# Patient Record
Sex: MALE | Race: BLACK OR AFRICAN AMERICAN | Employment: OTHER | URBAN - METROPOLITAN AREA
[De-identification: names, ages, dates, MRNs, and addresses within clinical notes are randomized per-mention and may not be internally consistent; named-entity substitution may affect disease eponyms.]

---

## 2019-03-17 ENCOUNTER — HOSPITAL ENCOUNTER (EMERGENCY)
Age: 24
Discharge: HOME | End: 2019-03-17
Attending: EMERGENCY MEDICINE
Payer: COMMERCIAL

## 2019-03-17 VITALS
WEIGHT: 135 LBS | OXYGEN SATURATION: 100 % | TEMPERATURE: 98 F | SYSTOLIC BLOOD PRESSURE: 128 MMHG | RESPIRATION RATE: 22 BRPM | HEART RATE: 78 BPM | HEIGHT: 68 IN | DIASTOLIC BLOOD PRESSURE: 82 MMHG | BODY MASS INDEX: 20.46 KG/M2

## 2019-03-17 DIAGNOSIS — F12.10 TETRAHYDROCANNABINOL (THC) USE DISORDER, MILD, ABUSE: ICD-10-CM

## 2019-03-17 DIAGNOSIS — R40.0 SOMNOLENCE: Primary | ICD-10-CM

## 2019-03-17 LAB
ALBUMIN SERPL-MCNC: 4.3 G/DL (ref 3.4–5)
ALBUMIN/GLOB SERPL: 1.3 {RATIO} (ref 0.8–1.7)
ALP SERPL-CCNC: 58 U/L (ref 45–117)
ALT SERPL-CCNC: 16 U/L (ref 16–61)
AMPHET UR QL SCN: NEGATIVE
ANION GAP SERPL CALC-SCNC: 7 MMOL/L (ref 3–18)
AST SERPL-CCNC: 17 U/L (ref 15–37)
ATRIAL RATE: 49 BPM
BARBITURATES UR QL SCN: NEGATIVE
BASOPHILS # BLD: 0 K/UL (ref 0–0.1)
BASOPHILS NFR BLD: 1 % (ref 0–2)
BENZODIAZ UR QL: NEGATIVE
BILIRUB SERPL-MCNC: 0.6 MG/DL (ref 0.2–1)
BUN SERPL-MCNC: 19 MG/DL (ref 7–18)
BUN/CREAT SERPL: 16 (ref 12–20)
CALCIUM SERPL-MCNC: 8.9 MG/DL (ref 8.5–10.1)
CALCULATED P AXIS, ECG09: 83 DEGREES
CALCULATED R AXIS, ECG10: 93 DEGREES
CALCULATED T AXIS, ECG11: 79 DEGREES
CANNABINOIDS UR QL SCN: POSITIVE
CHLORIDE SERPL-SCNC: 109 MMOL/L (ref 100–108)
CO2 SERPL-SCNC: 25 MMOL/L (ref 21–32)
COCAINE UR QL SCN: NEGATIVE
CREAT SERPL-MCNC: 1.16 MG/DL (ref 0.6–1.3)
DIAGNOSIS, 93000: NORMAL
DIFFERENTIAL METHOD BLD: ABNORMAL
EOSINOPHIL # BLD: 0.3 K/UL (ref 0–0.4)
EOSINOPHIL NFR BLD: 4 % (ref 0–5)
ERYTHROCYTE [DISTWIDTH] IN BLOOD BY AUTOMATED COUNT: 12.5 % (ref 11.6–14.5)
ETHANOL SERPL-MCNC: <3 MG/DL (ref 0–3)
GLOBULIN SER CALC-MCNC: 3.2 G/DL (ref 2–4)
GLUCOSE BLD STRIP.AUTO-MCNC: 105 MG/DL (ref 70–110)
GLUCOSE SERPL-MCNC: 110 MG/DL (ref 74–99)
HCT VFR BLD AUTO: 42.4 % (ref 36–48)
HDSCOM,HDSCOM: ABNORMAL
HGB BLD-MCNC: 13.9 G/DL (ref 13–16)
LYMPHOCYTES # BLD: 3.8 K/UL (ref 0.9–3.6)
LYMPHOCYTES NFR BLD: 59 % (ref 21–52)
MAGNESIUM SERPL-MCNC: 2.2 MG/DL (ref 1.6–2.6)
MCH RBC QN AUTO: 28.6 PG (ref 24–34)
MCHC RBC AUTO-ENTMCNC: 32.8 G/DL (ref 31–37)
MCV RBC AUTO: 87.2 FL (ref 74–97)
METHADONE UR QL: NEGATIVE
MONOCYTES # BLD: 0.5 K/UL (ref 0.05–1.2)
MONOCYTES NFR BLD: 8 % (ref 3–10)
NEUTS SEG # BLD: 1.9 K/UL (ref 1.8–8)
NEUTS SEG NFR BLD: 28 % (ref 40–73)
OPIATES UR QL: NEGATIVE
P-R INTERVAL, ECG05: 152 MS
PCP UR QL: NEGATIVE
PLATELET # BLD AUTO: 291 K/UL (ref 135–420)
PMV BLD AUTO: 9.3 FL (ref 9.2–11.8)
POTASSIUM SERPL-SCNC: 3.3 MMOL/L (ref 3.5–5.5)
PROT SERPL-MCNC: 7.5 G/DL (ref 6.4–8.2)
Q-T INTERVAL, ECG07: 434 MS
QRS DURATION, ECG06: 102 MS
QTC CALCULATION (BEZET), ECG08: 392 MS
RBC # BLD AUTO: 4.86 M/UL (ref 4.7–5.5)
SODIUM SERPL-SCNC: 141 MMOL/L (ref 136–145)
VENTRICULAR RATE, ECG03: 49 BPM
WBC # BLD AUTO: 6.5 K/UL (ref 4.6–13.2)

## 2019-03-17 PROCEDURE — 96360 HYDRATION IV INFUSION INIT: CPT

## 2019-03-17 PROCEDURE — 74011250636 HC RX REV CODE- 250/636: Performed by: EMERGENCY MEDICINE

## 2019-03-17 PROCEDURE — 80307 DRUG TEST PRSMV CHEM ANLYZR: CPT

## 2019-03-17 PROCEDURE — 83735 ASSAY OF MAGNESIUM: CPT

## 2019-03-17 PROCEDURE — 82962 GLUCOSE BLOOD TEST: CPT

## 2019-03-17 PROCEDURE — 80053 COMPREHEN METABOLIC PANEL: CPT

## 2019-03-17 PROCEDURE — 93005 ELECTROCARDIOGRAM TRACING: CPT

## 2019-03-17 PROCEDURE — 85025 COMPLETE CBC W/AUTO DIFF WBC: CPT

## 2019-03-17 PROCEDURE — 99285 EMERGENCY DEPT VISIT HI MDM: CPT

## 2019-03-17 RX ORDER — NALOXONE HYDROCHLORIDE 0.4 MG/ML
0.4 INJECTION, SOLUTION INTRAMUSCULAR; INTRAVENOUS; SUBCUTANEOUS
Status: DISCONTINUED | OUTPATIENT
Start: 2019-03-17 | End: 2019-03-17 | Stop reason: HOSPADM

## 2019-03-17 RX ADMIN — SODIUM CHLORIDE 1000 ML: 900 INJECTION, SOLUTION INTRAVENOUS at 17:15

## 2019-03-17 NOTE — ED PROVIDER NOTES
EMERGENCY DEPARTMENT HISTORY AND PHYSICAL EXAM    Date: 3/17/2019  Patient Name: Librado Fuentes    History of Presenting Illness     Chief Complaint   Patient presents with    Vomiting         History Provided By: patient     Chief Complaint: AMS  Duration: 45 minutes ago   Timing: acute   Location: N/A  Quality: groggy and out of it   Modifying Factors: initiated by smoking unknown vape pen PTA  Associated Symptoms: syncope, vomiting    Additional History (Context):   5:01 PM  Librado Fuentes is a 24 y.o. male with PMHX of asthma who presents to the emergency department with his mother C/O AMS onset 45 minutes ago. Pt explains that he [place] a vape [place] and smoked it which initiated his sxs. Mother states that pt is very groggy and \"out of it. \" Associated sxs include syncope and vomiting (vomited in triage bright blue emesis). HPI is limited due to pt's mental status change. PCP: None    Current Facility-Administered Medications   Medication Dose Route Frequency Provider Last Rate Last Dose    naloxone (NARCAN) injection 0.4 mg  0.4 mg IntraVENous NOW Juanis Bro MD   Stopped at 03/17/19 1799     Current Outpatient Medications   Medication Sig Dispense Refill    HYDROcodone-acetaminophen (NORCO) 5-325 mg per tablet Take 1 Tab by mouth every four (4) hours as needed for Pain. 20 Tab 0       Past History     Past Medical History:  Past Medical History:   Diagnosis Date    Asthma        Past Surgical History:  Past Surgical History:   Procedure Laterality Date    HX ORTHOPAEDIC         Family History:  History reviewed. No pertinent family history.     Social History:  Social History     Tobacco Use    Smoking status: Never Smoker    Smokeless tobacco: Current User   Substance Use Topics    Alcohol use: No     Frequency: Never    Drug use: No       Allergies:  No Known Allergies      Review of Systems   Review of Systems   Unable to perform ROS: Mental status change   Gastrointestinal: Positive for vomiting. Neurological: Positive for syncope. Physical Exam     Vitals:    03/17/19 1845 03/17/19 1900 03/17/19 1930 03/17/19 1945   BP: 110/71 130/69 115/86 120/66   Pulse: (!) 56 67 68 (!) 54   Resp: 16 19 16 14   Temp:       SpO2: 99% 100% 100% 100%   Weight:       Height:         Physical Exam  Vital signs and nursing note reviewed. CONSTITUTIONAL: Drowsy but arousable to voice, in no apparent distress; well-developed; well-nourished. HEAD:  Normocephalic, atraumatic  EYES: PERRL; EOM's intact. ENTM: Nose: no rhinorrhea; Throat: no erythema or exudate, mucous membranes moist  Neck:  No JVD, supple without lymphadenopathy  RESP: Chest clear, equal breath sounds. CV: S1 and S2 WNL; No murmurs, gallops or rubs. GI: Normal bowel sounds, abdomen soft and non-tender. No masses or organomegaly. : No costo-vertebral angle tenderness. BACK:  Non-tender  UPPER EXT:  Normal inspection  LOWER EXT: No edema, no calf tenderness. Distal pulses intact. NEURO: CN intact, reflexes 2/4 and sym, strength 5/5 and sym, sensation intact. GCS: E3V4M6 Total = 13. Pupils 4 mm b/l and minimally reactive. SKIN: normal for age and stage. PSYCH: drowsy but arousable to voice, oriented, normal affect.     Diagnostic Study Results     Labs -     Recent Results (from the past 12 hour(s))   GLUCOSE, POC    Collection Time: 03/17/19  5:08 PM   Result Value Ref Range    Glucose (POC) 105 70 - 110 mg/dL   CBC WITH AUTOMATED DIFF    Collection Time: 03/17/19  5:12 PM   Result Value Ref Range    WBC 6.5 4.6 - 13.2 K/uL    RBC 4.86 4.70 - 5.50 M/uL    HGB 13.9 13.0 - 16.0 g/dL    HCT 42.4 36.0 - 48.0 %    MCV 87.2 74.0 - 97.0 FL    MCH 28.6 24.0 - 34.0 PG    MCHC 32.8 31.0 - 37.0 g/dL    RDW 12.5 11.6 - 14.5 %    PLATELET 052 328 - 676 K/uL    MPV 9.3 9.2 - 11.8 FL    NEUTROPHILS 28 (L) 40 - 73 %    LYMPHOCYTES 59 (H) 21 - 52 %    MONOCYTES 8 3 - 10 %    EOSINOPHILS 4 0 - 5 %    BASOPHILS 1 0 - 2 %    ABS. NEUTROPHILS 1.9 1.8 - 8.0 K/UL    ABS. LYMPHOCYTES 3.8 (H) 0.9 - 3.6 K/UL    ABS. MONOCYTES 0.5 0.05 - 1.2 K/UL    ABS. EOSINOPHILS 0.3 0.0 - 0.4 K/UL    ABS. BASOPHILS 0.0 0.0 - 0.1 K/UL    DF AUTOMATED     METABOLIC PANEL, COMPREHENSIVE    Collection Time: 03/17/19  5:12 PM   Result Value Ref Range    Sodium 141 136 - 145 mmol/L    Potassium 3.3 (L) 3.5 - 5.5 mmol/L    Chloride 109 (H) 100 - 108 mmol/L    CO2 25 21 - 32 mmol/L    Anion gap 7 3.0 - 18 mmol/L    Glucose 110 (H) 74 - 99 mg/dL    BUN 19 (H) 7.0 - 18 MG/DL    Creatinine 1.16 0.6 - 1.3 MG/DL    BUN/Creatinine ratio 16 12 - 20      GFR est AA >60 >60 ml/min/1.73m2    GFR est non-AA >60 >60 ml/min/1.73m2    Calcium 8.9 8.5 - 10.1 MG/DL    Bilirubin, total 0.6 0.2 - 1.0 MG/DL    ALT (SGPT) 16 16 - 61 U/L    AST (SGOT) 17 15 - 37 U/L    Alk.  phosphatase 58 45 - 117 U/L    Protein, total 7.5 6.4 - 8.2 g/dL    Albumin 4.3 3.4 - 5.0 g/dL    Globulin 3.2 2.0 - 4.0 g/dL    A-G Ratio 1.3 0.8 - 1.7     MAGNESIUM    Collection Time: 03/17/19  5:12 PM   Result Value Ref Range    Magnesium 2.2 1.6 - 2.6 mg/dL   ETHYL ALCOHOL    Collection Time: 03/17/19  5:12 PM   Result Value Ref Range    ALCOHOL(ETHYL),SERUM <3 0 - 3 MG/DL   EKG, 12 LEAD, INITIAL    Collection Time: 03/17/19  6:09 PM   Result Value Ref Range    Ventricular Rate 49 BPM    Atrial Rate 49 BPM    P-R Interval 152 ms    QRS Duration 102 ms    Q-T Interval 434 ms    QTC Calculation (Bezet) 392 ms    Calculated P Axis 83 degrees    Calculated R Axis 93 degrees    Calculated T Axis 79 degrees    Diagnosis       Marked sinus bradycardia  Rightward axis  ST elevation, consider inferior injury or acute infarct  ACUTE MI  Abnormal ECG  No previous ECGs available     EKG, 12 LEAD, INITIAL    Collection Time: 03/17/19  6:20 PM   Result Value Ref Range    Ventricular Rate 53 BPM    Atrial Rate 53 BPM    P-R Interval 150 ms    QRS Duration 104 ms    Q-T Interval 432 ms    QTC Calculation (Bezet) 405 ms    Calculated P Axis 81 degrees    Calculated R Axis 88 degrees    Calculated T Axis 78 degrees    Diagnosis       Sinus bradycardia  Early repolarization  Otherwise normal ECG  When compared with ECG of 17-MAR-2019 18:09,  No significant change was found     DRUG SCREEN, URINE    Collection Time: 03/17/19  7:00 PM   Result Value Ref Range    BENZODIAZEPINES NEGATIVE  NEG      BARBITURATES NEGATIVE  NEG      THC (TH-CANNABINOL) POSITIVE (A) NEG      OPIATES NEGATIVE  NEG      PCP(PHENCYCLIDINE) NEGATIVE  NEG      COCAINE NEGATIVE  NEG      AMPHETAMINES NEGATIVE  NEG      METHADONE NEGATIVE  NEG      HDSCOM (NOTE)        Radiologic Studies -   No orders to display     CT Results  (Last 48 hours)    None        CXR Results  (Last 48 hours)    None          Medications given in the ED-  Medications   naloxone [facility]) injection 0.4 mg (0 mg IntraVENous Held 3/17/19 1707)   sodium chloride 0.9 % bolus infusion 1,000 mL (0 mL IntraVENous IV Completed 3/17/19 1812)         Medical Decision Making   I am the first provider for this patient. I reviewed the vital signs, available nursing notes, past medical history, past surgical history, family history and social history. Vital Signs-Reviewed the patient's vital signs. Pulse Oximetry Analysis - 100% on room air     EKG interpretation: (Preliminary)  Sinus bradycardia at 53 bpm, WA interval 150 ms, no STEMI  EKG read by Susanne Leon MD at 6:20 PM     Records Reviewed: Nursing Notes and Old Medical Records    Provider Notes (Medical Decision Making):   DDx: ethanol intoxication, drug intoxication, occult infection, metabolic derangement      Procedures:  Procedures    ED Course:   5:01 PM Initial assessment performed. The patients presenting problems have been discussed, and they are in agreement with the care plan formulated and outlined with them. I have encouraged them to ask questions as they arise throughout their visit.     6:15 PM Pt woke up briefly, cursed profusely, and apologized profusely for pukin on someone who he did not know. 8:20 PM  Reevaluated patient. He is alert and oriented x4. NEURO: CN intact, reflexes 2/4 and sym, strength 5/5 and sym, sensation intact. Normal rapid alternating movements, normal Rhomberg, normal finger-to-nose, no pronator drift. Pt desires discharge home at this time. Diagnosis and Disposition     Discussion:   24year old male presented with decreased alertness and vomiting after using a vape that he found at work. Labs positive for THC. Pt observed for 3 hours until he improved clinically and had a normal exam. Pt stable for discharge home. Pt and family agree with the plan. DISCHARGE NOTE:  8:27 PM    Nathaniel LOPEZ Josh's  results have been reviewed with him. He has been counseled regarding his diagnosis, treatment, and plan. He verbally conveys understanding and agreement of the signs, symptoms, diagnosis, treatment and prognosis and additionally agrees to follow up as discussed. He also agrees with the care-plan and conveys that all of his questions have been answered. I have also provided discharge instructions for him that include: educational information regarding their diagnosis and treatment, and list of reasons why they would want to return to the ED prior to their follow-up appointment, should his condition change. He has been provided with education for proper emergency department utilization. CLINICAL IMPRESSION:    1. Somnolence    2. Tetrahydrocannabinol (THC) use disorder, mild, abuse        PLAN:  1. D/C Home  2. Current Discharge Medication List        3. Follow-up Information     Follow up With Specialties Details Why Contact Info    Janeen Sterling MD Family Practice Schedule an appointment as soon as possible for a visit in 1 week For follow up from Emergency Department visit.  [address]      [facility] EMERGENCY DEPT Emergency Medicine  As needed; If symptoms worsen [address]  [address] [id number]  [phone]        _______________________________    Attestations: This note is prepared by Tianna Anderson, acting as Scribe for Eduardo Barros MD.    Eduardo Barros MD:  The scribe's documentation has been prepared under my direction and personally reviewed by me in its entirety.   I confirm that the note above accurately reflects all work, treatment, procedures, and medical decision making performed by me.  _______________________________

## 2019-03-17 NOTE — ED TRIAGE NOTES
Patient arrives at the ED with mother. Patient appears disoriented, admits to \"smoking a vape\" he found in the parking lot with an unknown substance in it. Patient vomited x1 in triage: blue, watery emesis with food pieces throughout. Is GCS15. Patient flailing arms and appears intoxicated.

## 2019-03-18 NOTE — DISCHARGE INSTRUCTIONS
Patient Education        Marijuana Use: Care Instructions  Your Care Instructions  During your exam, traces of marijuana were found in your body. The active chemical in marijuana is THC. THC usually can be found in urine for a few days after marijuana is used. If use is heavy, THC may be found for weeks after use has stopped. In the [place], marijuana laws vary widely. The drug is legal in some states, mainly as a medical treatment. But marijuana possession is still a crime under federal law. Many employers have strict rules about drug use. Many do drug testing. A positive drug test might cause you to lose your job. Or it might keep you from getting hired. Follow-up care is a key part of your treatment and safety. Be sure to make and go to all appointments, and call your doctor if you are having problems. It's also a good idea to know your test results and keep a list of the medicines you take. How can you care for yourself at home? · If you use marijuana, use it safely. Do not drive or operate heavy equipment. Using marijuana may affect your judgment and coordination. It can also increase your risk of being in a car crash. · Make sure you understand the laws in your area. Using marijuana may cause legal problems. · Know your employer's policies. When should you call for help? Watch closely for changes in your health, and contact your doctor if you think you have a problem with marijuana use. Where can you learn more? Go to http://hawa-zeynep.info/. Enter N726 in the search box to learn more about \"Marijuana Use: Care Instructions. \"  Current as of: May 7, 2018  Content Version: 11.9  © 8731-1257 [employer]. Care instructions adapted under license by [employer] (which disclaims liability or warranty for this information).  If you have questions about a medical condition or this instruction, always ask your healthcare professional. [employer] disclaims any warranty or liability for your use of this information.

## 2019-05-14 LAB
ATRIAL RATE: 53 BPM
CALCULATED P AXIS, ECG09: 81 DEGREES
CALCULATED R AXIS, ECG10: 88 DEGREES
CALCULATED T AXIS, ECG11: 78 DEGREES
DIAGNOSIS, 93000: NORMAL
P-R INTERVAL, ECG05: 150 MS
Q-T INTERVAL, ECG07: 432 MS
QRS DURATION, ECG06: 104 MS
QTC CALCULATION (BEZET), ECG08: 405 MS
VENTRICULAR RATE, ECG03: 53 BPM

## 2021-12-10 ENCOUNTER — APPOINTMENT (OUTPATIENT)
Dept: GENERAL RADIOLOGY | Age: 26
End: 2021-12-10
Attending: STUDENT IN AN ORGANIZED HEALTH CARE EDUCATION/TRAINING PROGRAM
Payer: OTHER GOVERNMENT

## 2021-12-10 ENCOUNTER — HOSPITAL ENCOUNTER (EMERGENCY)
Age: 26
Discharge: HOME | End: 2021-12-10
Attending: STUDENT IN AN ORGANIZED HEALTH CARE EDUCATION/TRAINING PROGRAM
Payer: OTHER GOVERNMENT

## 2021-12-10 VITALS
HEIGHT: 68 IN | BODY MASS INDEX: 21.22 KG/M2 | DIASTOLIC BLOOD PRESSURE: 92 MMHG | HEART RATE: 74 BPM | TEMPERATURE: 97 F | RESPIRATION RATE: 17 BRPM | WEIGHT: 140 LBS | OXYGEN SATURATION: 99 % | SYSTOLIC BLOOD PRESSURE: 139 MMHG

## 2021-12-10 DIAGNOSIS — S62.664A CLOSED NONDISPLACED FRACTURE OF DISTAL PHALANX OF RIGHT RING FINGER, INITIAL ENCOUNTER: ICD-10-CM

## 2021-12-10 DIAGNOSIS — K12.0 APHTHOUS ULCER: Primary | ICD-10-CM

## 2021-12-10 PROCEDURE — 99282 EMERGENCY DEPT VISIT SF MDM: CPT

## 2021-12-10 PROCEDURE — 73140 X-RAY EXAM OF FINGER(S): CPT

## 2021-12-10 RX ORDER — HYDROCODONE BITARTRATE AND ACETAMINOPHEN 5; 325 MG/1; MG/1
1 TABLET ORAL
Qty: 10 TABLET | Refills: 0 | Status: SHIPPED | OUTPATIENT
Start: 2021-12-10 | End: 2021-12-13

## 2021-12-10 RX ORDER — KETOROLAC TROMETHAMINE 10 MG/1
10 TABLET, FILM COATED ORAL
Qty: 10 TABLET | Refills: 0 | Status: SHIPPED | OUTPATIENT
Start: 2021-12-10

## 2021-12-10 NOTE — ED PROVIDER NOTES
EMERGENCY DEPARTMENT HISTORY AND PHYSICAL EXAM      Date: 12/10/2021  Patient Name: Juan Cotto    History of Presenting Illness     Chief Complaint   Patient presents with    Finger Pain       History Provided By: Patient    HPI:  Juan Cotto is a 31 y.o. male with right ring finger pain for greater than 1 week. He \"jammed\" it one week ago, was splinted by Omi Yeung 57 he had the splint off and her a pop when moving it. 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 No loss of function, per patient it is stiff. The patient denies any aggravating or alleviating factors - and has not taken any medications in an attempt to alleviate his symptoms. PMH, PSH, family history, social history, allergies reviewed with the patient with significant items noted above. ---  Pregnancy -     PCP: Gayle Alvarado MD    Current Outpatient Medications   Medication Sig Dispense Refill    ketorolac (TORADOL) 10 mg tablet Take 1 Tablet by mouth every eight (8) hours as needed for Pain for up to 10 doses. 10 Tablet 0       Past History     Past Medical History:  Past Medical History:   Diagnosis Date    Asthma        Past Surgical History:  Past Surgical History:   Procedure Laterality Date    HX ORTHOPAEDIC         Family History:  No family history on file.     Social History:  Social History     Tobacco Use    Smoking status: Never Smoker    Smokeless tobacco: Current User   Substance Use Topics    Alcohol use: No    Drug use: No       Allergies:  No Known Allergies    Review of Systems   Review of Systems    In addition to that documented in the HPI above  All other review of systems negative    Constitutional: Denies fevers or chills  Eyes: Denies vision changes  ENMT: Denies sore throat  CV: Denies chest pain  Resp: Denies SOB  GI: Denies vomiting or diarrhea  : Denies painful urination  MSK: Denies recent trauma  Skin: Denies new rashes  Neuro: Denies new numbness or tingling or weakness  Endocrine: Denies polyuria  Heme: Denies bleeding disorders    Physical Exam     Vitals:    12/10/21 1820   BP: (!) 139/92   Pulse: 74   Resp: 17   Temp: 97 °F (36.1 °C)   SpO2: 99%   Weight: 63.5 kg (140 lb)   Height: 5' 8\" (1.727 m)     Physical Exam    Nursing notes and vital signs reviewed  General: Patient is awake and alert, resting comfortably in no acute distress  Head: Normocephalic, Atraumatic  Eyes: EOMI, no conjunctival pallor  Neck: Supple, Normal external exam  Cardiovascular: RRR, no murmur auscultated, warm, well-perfused extremities  Chest: Normal work of breathing and chest excursion bilaterally  Respiratory: Patient is in no respiratory distress, lungs CTAB  Abdomen: Soft, non tender, non distended  Back: No evidence of trauma or deformity  Extremities: No evidence of trauma or deformity, no LE edema  Skin: Warm and dry, intact  Neuro: Alert and appropriate, CN intact, normal speech, strength and sensation full and symmetric bilaterally, normal gait, normal coordination  Psychiatric: Normal mood and affect    Medical Decision Making   I am the first provider for this patient. I reviewed the vital signs, available nursing notes, past medical history, past surgical history, family history and social history. Vital Signs-Reviewed the patient's vital signs. Visit Vitals  BP (!) 139/92 (BP 1 Location: Left upper arm, BP Patient Position: At rest;Sitting)   Pulse 74   Temp 97 °F (36.1 °C)   Resp 17   Ht 5' 8\" (1.727 m)   Wt 63.5 kg (140 lb)   SpO2 99%   BMI 21.29 kg/m²       Pulse Oximetry Analysis - 100% on room air     Cardiac Monitor:  Rate: 74 bpm    Provider Notes (Medical Decision Making):   Kervin Gao is a 31 y.o. male with finger pain after limited traumatic injury of jamming his finger. He states that he heard a pop yesterday, consistent with ligamentous injury or isolated fracture.     Procedures:  Procedures    ED Course:       X-ray per my read with small avulsion fracture, will need splinting, rest, Ortho hand follow-up. He request evaluation for aphthous ulcers in his oral cavity, no fever significant lymphadenopathy, do not suspect gingiva stomatitis, not herpetic in analysis, will recommend he refrain from using sodium lauryl sulfate toothpaste, follow-up with dentist.        Janice Petit Review/addressed -     Diagnostic Study Results     Orders Placed This Encounter    APPLY SPLINT FINGER     Standing Status:   Standing     Number of Occurrences:   1    XR 4TH FINGER RT MIN 2 V     Standing Status:   Standing     Number of Occurrences:   1     Order Specific Question:   Transport     Answer:   Ambulatory [1]     Order Specific Question:   Reason for Exam     Answer:   ring injury    HYDROcodone-acetaminophen (Norco) 5-325 mg per tablet     Sig: Take 1 Tablet by mouth every six (6) hours as needed for Pain for up to 3 days. Max Daily Amount: 4 Tablets. Dispense:  10 Tablet     Refill:  0    ketorolac (TORADOL) 10 mg tablet     Sig: Take 1 Tablet by mouth every eight (8) hours as needed for Pain for up to 10 doses. Dispense:  10 Tablet     Refill:  0       Labs -   No results found for this or any previous visit (from the past 12 hour(s)). Radiologic Studies -   XR 4TH FINGER RT MIN 2 V   Final Result   3 mm intra-articular avulsion fracture off of the dorsal aspect of   the fourth distal phalanx. Soft tissue swelling of the fourth finger. CT Results  (Last 48 hours)    None        CXR Results  (Last 48 hours)    None          Disposition     Disposition:  Home also, during his course,    CLINICAL IMPRESSION:    1. Aphthous ulcer    2.  Closed nondisplaced fracture of distal phalanx of right ring finger, initial encounter        It should be noted that I will be the provider of record for this patient  Kiana Chadwick MD    Follow-up Information     Follow up With Specialties Details Why Lamar Huang MD Family Medicine Call in 1 day as needed [address] [address]  [phone]      [facility] EMERGENCY DEPT Emergency Medicine Go to  If symptoms worsen [address]  [phone]    Claudette Alvarez MD Orthopedic Surgery Call in 1 week for follow up [address] [facility]  [phone]            Discharge Medication List as of 12/10/2021  8:08 PM      START taking these medications    Details   ketorolac (TORADOL) 10 mg tablet Take 1 Tablet by mouth every eight (8) hours as needed for Pain for up to 10 doses. , Normal, Disp-10 Tablet, R-0         CONTINUE these medications which have CHANGED    Details   HYDROcodone-acetaminophen (Norco) 5-325 mg per tablet Take 1 Tablet by mouth every six (6) hours as needed for Pain for up to 3 days. Max Daily Amount: 4 Tablets., Normal, Disp-10 Tablet, R-0             Please note that this dictation was completed with [employer], the computer voice recognition software. Quite often unanticipated grammatical, syntax, homophones, and other interpretive errors are inadvertently transcribed by the computer software. Please disregard these errors. Please excuse any errors that have escaped final proofreading.

## 2021-12-11 NOTE — DISCHARGE INSTRUCTIONS
Follow up with hand orthopedics. Utilize toothpaste that is free of sodium lauryl sulfate, such as Sensodyne pro enamel or gentle whitening. Please carefully read all discharge instructions    Please follow-up with a primary care physician and if you do not have one currently use the contact information provided to obtain an appointment. If none was provided please call the number on the back of your insurance card to locate a Primary care doctor. Many offices have \"cancellation lists\" that you can ask to be placed on; should a patient with an earlier appointment cancel you will be notified to take their place. Please return to the Emergency Room immediately if your symptoms worsen. Please return to the Emergency Department if you develop a fever, chills, cannot eat or drink due to nausea or vomiting, or if any of your symptoms worsen. If you do not have insurance you can use the below for your medications. [email or web address]    What are GoodRx coupons? GoodRx coupons will help you pay less than the cash price for your prescription. Wenceslao Leak free to use and are accepted at virtually every [place] pharmacy. Your pharmacist will know how to enter the codes on the coupon to pull up the lowest discount available. RICE, Routine Care for Bruises, Sprains and Strains    The routine care of injuries may include any of the following: Rest, Ice, Compression, and Elevation (RICE). Rest is required to allow your body to heal. Usually following bumps and bruises, everyday activities can be resumed as soon as you are comfortable. Injured tendons (cord like structures that attach muscle to bone) and bones take about 6 weeks to heal.    Ice following an injury helps keep the swelling down. Ice helps reduce pain. Do not apply ice directly to skin.  Apply ice bags (ice in a plastic bag wrapped in a towel to prevent frostbite to skin) about every 2 hours for 20 minutes, while awake, to the injured area for the first 24 hours to 48 hours. After the first 24 to 48 hours, use as directed by your caregiver. Compression helps keep swelling down, gives support, and helps with discomfort. If an ace bandage (stretchy, elastic wrapping bandage) has been applied today, it should be removed and reapplied every 3 to 4 hours. It should not be applied tightly. It should be applied firmly enough to keep swelling down. Watch your fingers or toes for swelling, bluish discoloration, coldness, numbness, or excessive pain. If any of these symptoms (problems) occur, remove the ace bandage and reapply more loosely. If these symptoms persist, contact your caregiver or return to this location. Elevation helps reduce swelling, and decreases pain. With extremities (arms/hands and legs/feet), the injured area should be placed near to or above the heart level if patient is able. If a splint, special shoe or cast has been applied (used) today, watch the parts involved for any of the above problems noted in use of compression. HOME CARE INSTRUCTIONS  Persistent pain and inability to use the injured area for more than 2 to 3 days are warning signs indicating  you should see a caregiver for a follow-up visit as soon as possible. Initially, a hairline fracture (the same as a broken bone) may not be seen on x-rays. Persistent pain and swelling indicate that further evaluation, non-weight bearing (use of crutches as instructed), and/or further x-rays are needed. X-rays may sometimes not show a small fracture until a week or ten days later. Make a follow-up appointment with your own caregiver or one to whom you have been referred. Make a rice pack for heat and aches and pains    Using dry white rice and a tube sock. Fill the sock with rice. Leave enough room at the top so you can close the opening by  tying it with a rubber band or string -- basically anything you think will hold the rice in.   Microwave on high for 1-2 minutes. Remove from the microwave (again, be careful, it will be hot). Apply to area that is sore. If you need more time once the heating pad has gone cold, microwave again for 1 minute and reapply. Making your own heating pad is cost-efficient and safer than using an electric heating pad. It also saves you a trip to the store, when youre too sore to leave the house. Schedule an appointment with your doctor if muscle and joint pain persists for several days.